# Patient Record
Sex: FEMALE | Race: WHITE
[De-identification: names, ages, dates, MRNs, and addresses within clinical notes are randomized per-mention and may not be internally consistent; named-entity substitution may affect disease eponyms.]

---

## 2022-01-10 ENCOUNTER — HOSPITAL ENCOUNTER (OUTPATIENT)
Dept: HOSPITAL 77 - KA.SDS | Age: 72
Discharge: HOME | End: 2022-01-10
Attending: FAMILY MEDICINE
Payer: MEDICARE

## 2022-01-10 DIAGNOSIS — D12.0: Primary | ICD-10-CM

## 2022-01-10 DIAGNOSIS — I10: ICD-10-CM

## 2022-01-10 DIAGNOSIS — M81.0: ICD-10-CM

## 2022-01-10 DIAGNOSIS — Z98.890: ICD-10-CM

## 2022-01-10 DIAGNOSIS — Z79.899: ICD-10-CM

## 2022-01-10 DIAGNOSIS — Z88.8: ICD-10-CM

## 2022-01-10 NOTE — PCM.PRNOTE
- Free Text/Narrative


Note: 


PROCEDURE PERFORMED:


Colonoscopy with biopsy





PRE-PROCEDURE DIAGNOSIS/INDICATION FOR PROCEDURE:


11/30/21 Cologuard positive; last colonoscopy 3/22/11 normal





CONSENT:


Informed consent was obtained prior to the procedure after discussion of the 

risks (including pain, bleeding, infection, perforation, missed polyps, virgen

bility to completely remove polyps or complete procedure necessitating repeat 

colonoscopy, adverse reaction to anesthesia, cardiovascular event), benefits and

alternatives and expected outcomes. The patient expressed understanding and 

wished to proceed.  Verbal consent given and consent form signed. 





PROCEDURAL PAUSE:


Completed





SEDATION: 


Per anesthesia


 


DESCRIPTION OF PROCEDURE: 


Patient was placed in the left lateral decubitus position.  After adequate 

sedation and anesthetic was administered, a rectal exam was performed revealing 

no abnormalities.  A lubricated Olympus Video Colonoscope was inserted into the 

rectum and air and water insufflation was performed.  The colonoscope was 

advanced through the rectum, sigmoid, descending, transverse, and ascending 

colon without difficulties, albeit loss of IV access mid-procedure for which 

scope advancement was paused until appropriate access and anesthesia could be 

provided.  The cecum was reached and the ileocecal valve as well as the a

ppendiceal orifice were identified and pictorially documented. After adequate 

visualization of the cecum, the scope was withdrawn, giving 360-degree views of 

the colonic mucosa and retroflexion was performed in the rectum with the 

following findings noted:


   Ileocecal valve: Normal


   Cecum: <1cm area of fairly flat mucosal abnormality with partially polypoid 

appearance for which location and orientation wasn't felt to be amenable to 

lift, so piecemeal biopsies obtained for pathologic evaluation and guidance of 

further management        


   Ascending colon: Normal


   Hepatic flexure: Normal


   Transverse colon: Normal


   Splenic flexure: Normal


   Descending colon: Normal


   Sigmoid colon: Normal


   Rectum: Normal





The scope was straightened, air suction performed, and the scope withdrawn 

without complication. Preparation adequacy [place] Bowel Score 9/9.





IMPRESSION:


Colonoscopy performed revealing


- Cecum mucosal abnormality/polyp (see details above), pathology now pending





PLAN: 


Will contact the patient when pathology results received with recommendation for

further management and/or future colonoscopy.